# Patient Record
(demographics unavailable — no encounter records)

---

## 2025-03-22 NOTE — HISTORY OF PRESENT ILLNESS
[[email or web address]] : CC: PT requests br check and mammogram referral HPI: Previous mammogram/br sono 2024- Birad 0, pt didn't remember if she went back for f/u studies.   LMP: 2025 POBGYN: Reg menses TOP x 0  x 2    PAP: 2024 Mammo:  Colonoscopy: